# Patient Record
(demographics unavailable — no encounter records)

---

## 2025-03-25 NOTE — REVIEW OF SYSTEMS
[Fatigue: Grade 1 - Fatigue relieved by rest] : Fatigue: Grade 1 - Fatigue relieved by rest [Cough: Grade 0] : Cough: Grade 0 [Dyspnea: Grade 0] : Dyspnea: Grade 0

## 2025-03-26 NOTE — HISTORY OF PRESENT ILLNESS
[FreeTextEntry1] : Ms. 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 She completed 5/5 fractions to the right lower Lung to a dose of 50 Gy completed on 5/3/2024.   HPI: 12/14/2021: magseed localization lumpectomy right breast, 8:00 consistent with IDC, grade 2, 5mm, margins negative DCIS present, grade 2 pT1a, ER/MO+, HER2-  1/26/24: CT chest - irregular, partially cavitary right lower lobe mass measuring 2.8 x 1.5 x 2.8cm, non-specific but worrisome for bronchogenic carcinoma.  2/5/24: PET/CT - The irregular 2.8 cm RLL pulmonary mass demonstrates moderate hypermetabolic activity with SUV max of 3.9. No uptake noted in hilar or mediastinal region. No evidence of uptake noted elsewhere on the body.  6/14/2024 Today Ms Soto returns for initial SBRT follow up. She is now s/p RT to the right lower lung 5 Fxs to 50 GY completed on 5/3/24. She is being followed by Dr Santos and Aurelio. She is scheduled for her PET.CT in 2 months. She is feeling tired which is what she was feeling prior to treatment. She has a dry cough which started about a week ago. She denies any SOB or HEATH.  9/18/2024 3-month F/U visit Ms Soto returns today to review her PET CT done at [facility] on 9/3/2024. Impression: compared to PET 2/5/24 there is decreased size and significantly decreased metabolic activity of the right lower lobe . Adjacent consolidation is increased compared to recent chest CT and demonstrated mild hypermetabolic activity most likely post radiation changes. She followed up with Dr William Spivey Med Onc on 6/19/24. She had a CT of chest at [facility] which was ordered by him on 8/7/24 this revealed: Interval improvement in partially cavitary RLL lesion. New steaky opacities representing post radiation changes New irregular 11mm density lateral and inferior to the lesion these could also be secondary to to post RT changes Enlarged lobular thyroid consider US.  She will see Dr Carey today for follow up as well. Ms. Soto presents today for her follow up and is feeling well. She denies any pain, SOB, or HEATH. She does have a cough mostly at night and she does expectorate a lot of mucous. She feels tired and does not have a lot of motivation.  3/25/2025 Ms. Soto presents today for a follow up.  3/18/2025 CT Chest ([facility]) revealed nonspecific moderate patchy airspace densities RLL and small adjacent airspace density posterior right middle lobe with similar appearance to the recent prior PET/CT as described. No definite new or increasing pulmonary lesion.  She is overall feeling well. She is having some discomfort int he right shoulder and back. She denies any SOB, HEATH, or cough. She does get dry mouth at night.  She had a thyroid biopsy which negative. She is seeing Dr. Carey today.

## 2025-03-26 NOTE — HISTORY OF PRESENT ILLNESS
[FreeTextEntry1] : Ms. 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 She completed 5/5 fractions to the right lower Lung to a dose of 50 Gy completed on 5/3/2024.   HPI: 12/14/2021: magseed localization lumpectomy right breast, 8:00 consistent with IDC, grade 2, 5mm, margins negative DCIS present, grade 2 pT1a, ER/CT+, HER2-  1/26/24: CT chest - irregular, partially cavitary right lower lobe mass measuring 2.8 x 1.5 x 2.8cm, non-specific but worrisome for bronchogenic carcinoma.  2/5/24: PET/CT - The irregular 2.8 cm RLL pulmonary mass demonstrates moderate hypermetabolic activity with SUV max of 3.9. No uptake noted in hilar or mediastinal region. No evidence of uptake noted elsewhere on the body.  6/14/2024 Today Ms Soto returns for initial SBRT follow up. She is now s/p RT to the right lower lung 5 Fxs to 50 GY completed on 5/3/24. She is being followed by Dr Santos and Aurelio. She is scheduled for her PET.CT in 2 months. She is feeling tired which is what she was feeling prior to treatment. She has a dry cough which started about a week ago. She denies any SOB or HEATH.  9/18/2024 3-month F/U visit Ms Soto returns today to review her PET CT done at [facility] on 9/3/2024. Impression: compared to PET 2/5/24 there is decreased size and significantly decreased metabolic activity of the right lower lobe . Adjacent consolidation is increased compared to recent chest CT and demonstrated mild hypermetabolic activity most likely post radiation changes. She followed up with Dr William Spivey Med Onc on 6/19/24. She had a CT of chest at [facility] which was ordered by him on 8/7/24 this revealed: Interval improvement in partially cavitary RLL lesion. New steaky opacities representing post radiation changes New irregular 11mm density lateral and inferior to the lesion these could also be secondary to to post RT changes Enlarged lobular thyroid consider US.  She will see Dr Carey today for follow up as well. Ms. Soto presents today for her follow up and is feeling well. She denies any pain, SOB, or HEATH. She does have a cough mostly at night and she does expectorate a lot of mucous. She feels tired and does not have a lot of motivation.  3/25/2025 Ms. Soto presents today for a follow up.  3/18/2025 CT Chest ([facility]) revealed nonspecific moderate patchy airspace densities RLL and small adjacent airspace density posterior right middle lobe with similar appearance to the recent prior PET/CT as described. No definite new or increasing pulmonary lesion.  She is overall feeling well. She is having some discomfort int he right shoulder and back. She denies any SOB, HEATH, or cough. She does get dry mouth at night.  She had a thyroid biopsy which negative. She is seeing Dr. Carey today.

## 2025-03-26 NOTE — HISTORY OF PRESENT ILLNESS
[FreeTextEntry1] : Ms. 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 She completed 5/5 fractions to the right lower Lung to a dose of 50 Gy completed on 5/3/2024.   HPI: 12/14/2021: magseed localization lumpectomy right breast, 8:00 consistent with IDC, grade 2, 5mm, margins negative DCIS present, grade 2 pT1a, ER/[place]+, HER2-  1/26/24: CT chest - irregular, partially cavitary right lower lobe mass measuring 2.8 x 1.5 x 2.8cm, non-specific but worrisome for bronchogenic carcinoma.  2/5/24: PET/CT - The irregular 2.8 cm RLL pulmonary mass demonstrates moderate hypermetabolic activity with SUV max of 3.9. No uptake noted in hilar or mediastinal region. No evidence of uptake noted elsewhere on the body.  6/14/2024 Today Ms Soto returns for initial SBRT follow up. She is now s/p RT to the right lower lung 5 Fxs to 50 GY completed on 5/3/24. She is being followed by Dr Santos and Aurelio. She is scheduled for her PET.CT in 2 months. She is feeling tired which is what she was feeling prior to treatment. She has a dry cough which started about a week ago. She denies any SOB or HEATH.  9/18/2024 3-month F/U visit Ms Soto returns today to review her PET CT done at [facility] on 9/3/2024. Impression: compared to PET 2/5/24 there is decreased size and significantly decreased metabolic activity of the right lower lobe . Adjacent consolidation is increased compared to recent chest CT and demonstrated mild hypermetabolic activity most likely post radiation changes. She followed up with Dr William Spivey Med Onc on 6/19/24. She had a CT of chest at [facility] which was ordered by him on 8/7/24 this revealed: Interval improvement in partially cavitary RLL lesion. New steaky opacities representing post radiation changes New irregular 11mm density lateral and inferior to the lesion these could also be secondary to to post RT changes Enlarged lobular thyroid consider US.  She will see Dr Carey today for follow up as well. Ms. Soto presents today for her follow up and is feeling well. She denies any pain, SOB, or HEATH. She does have a cough mostly at night and she does expectorate a lot of mucous. She feels tired and does not have a lot of motivation.  3/25/2025 Ms. Soto presents today for a follow up.  3/18/2025 CT Chest ([facility]) revealed nonspecific moderate patchy airspace densities RLL and small adjacent airspace density posterior right middle lobe with similar appearance to the recent prior PET/CT as described. No definite new or increasing pulmonary lesion.  She is overall feeling well. She is having some discomfort int he right shoulder and back. She denies any SOB, HEATH, or cough. She does get dry mouth at night.  She had a thyroid biopsy which negative. She is seeing Dr. Carey today.

## 2025-03-26 NOTE — DISEASE MANAGEMENT
[Clinical] : TNM Stage: c [I] : I [TTNM] : 1 [NTNM] : 0 [MTNM] : 0 [de-identified] : RT to the Left lung 5 Fxs to 50 GY completed on 5/3/24

## 2025-03-26 NOTE — DISEASE MANAGEMENT
[Clinical] : TNM Stage: c [I] : I [TTNM] : 1 [NTNM] : 0 [MTNM] : 0 [de-identified] : RT to the Left lung 5 Fxs to 50 GY completed on 5/3/24

## 2025-03-26 NOTE — DISEASE MANAGEMENT
[Clinical] : TNM Stage: c [I] : I [TTNM] : 1 [NTNM] : 0 [MTNM] : 0 [de-identified] : RT to the Left lung 5 Fxs to 50 GY completed on 5/3/24

## 2025-03-26 NOTE — PHYSICAL EXAM
Patient presents to ed with chief complaint of Urinary catheter problem. Patient states that urine is escaping around the calabrese. Patient resting in bed. Respirations easy and unlabored. No distress noted. Call light within reach. [Normal] : oriented to person, place and time, the affect was normal, the mood was normal and not anxious